# Patient Record
Sex: MALE | Race: OTHER | HISPANIC OR LATINO | Employment: FULL TIME | URBAN - METROPOLITAN AREA
[De-identification: names, ages, dates, MRNs, and addresses within clinical notes are randomized per-mention and may not be internally consistent; named-entity substitution may affect disease eponyms.]

---

## 2020-12-07 ENCOUNTER — APPOINTMENT (OUTPATIENT)
Dept: CT IMAGING | Age: 55
End: 2020-12-07
Attending: EMERGENCY MEDICINE

## 2020-12-07 ENCOUNTER — HOSPITAL ENCOUNTER (EMERGENCY)
Age: 55
Discharge: HOME | End: 2020-12-07
Attending: EMERGENCY MEDICINE

## 2020-12-07 VITALS
BODY MASS INDEX: 25.61 KG/M2 | WEIGHT: 150 LBS | DIASTOLIC BLOOD PRESSURE: 77 MMHG | HEIGHT: 64 IN | RESPIRATION RATE: 16 BRPM | TEMPERATURE: 98.4 F | SYSTOLIC BLOOD PRESSURE: 140 MMHG | HEART RATE: 61 BPM | OXYGEN SATURATION: 99 %

## 2020-12-07 DIAGNOSIS — G51.0 BELL'S PALSY: Primary | ICD-10-CM

## 2020-12-07 LAB — GLUCOSE BLD STRIP.AUTO-MCNC: 107 MG/DL (ref 65–100)

## 2020-12-07 PROCEDURE — 82962 GLUCOSE BLOOD TEST: CPT

## 2020-12-07 PROCEDURE — 99284 EMERGENCY DEPT VISIT MOD MDM: CPT

## 2020-12-07 PROCEDURE — 70450 CT HEAD/BRAIN W/O DYE: CPT

## 2020-12-07 PROCEDURE — 74011636637 HC RX REV CODE- 636/637: Performed by: EMERGENCY MEDICINE

## 2020-12-07 RX ORDER — PREDNISONE 20 MG/1
40 TABLET ORAL DAILY
Qty: 8 TAB | Refills: 0 | Status: SHIPPED | OUTPATIENT
Start: 2020-12-07 | End: 2020-12-11

## 2020-12-07 RX ORDER — ACYCLOVIR 800 MG/1
800 TABLET ORAL
Qty: 35 TAB | Refills: 0 | Status: SHIPPED | OUTPATIENT
Start: 2020-12-07 | End: 2020-12-14

## 2020-12-07 RX ADMIN — PREDNISONE 60 MG: 10 TABLET ORAL at 18:49

## 2020-12-07 NOTE — PROGRESS NOTES
rounded on patient with nurse. Interpreting services offered when needed.       [address] [facility]  Language Services Department  [place] [id number] [phone] (phone)

## 2020-12-07 NOTE — ED PROVIDER NOTES
[facility] EMERGENCY DEPT   Arrival Date/Time: 12/7/2020 @ [address] Quirino Jarrell  MRN: [id number]      48 y.o. male    YOB: 1965   Telephone Information:   Mobile [phone] (home)     Seen in: HA/A  Seen on 12/7/2020 @ 6:23 PM       Today's Chief Complaint:   Chief Complaint   Patient presents with    Facial Droop     HPI (4+): well apppearing 47 yo male presents to the ED with left sided facial droop. sts he was having problems brushing teeth and washing face this morning after waking. Later when he went to the office at work they asked if he was okay and wondered what was wrong with his eye and at lumch his drink was dribbling out of the left side of his mouth    Pt unable to fully close the left eye, left sided facial droop    No other focal deficits  No weakness  No dyarthtria  No ataxia  No dizziness    No previous episodes   HPI    Review of Systems (10+): Review of Systems   Constitutional: Negative for activity change, appetite change, fatigue and fever. HENT: Negative for rhinorrhea and sore throat. Eyes: Negative for photophobia, pain and redness. Respiratory: Negative for cough, shortness of breath and wheezing. Cardiovascular: Negative for chest pain and palpitations. Gastrointestinal: Negative for abdominal pain, nausea and vomiting. Genitourinary: Negative for dysuria and frequency. Musculoskeletal: Negative for back pain. Skin: Negative for color change and rash. Neurological: Negative for dizziness, speech difficulty, weakness and headaches. Psychiatric/Behavioral: Negative. Past Medical History: Primary Care Doctor: Maeve Pierre DO  [X] Meds, Medical Hx, Surgical Hx, Family Hx, Social Hx are reviewed & located at the end of this note     Allergies: No Known Allergies      Key Anti-Platelet Anticoagulant Meds     The patient is on no antiplatelet meds or anticoagulants.           Physical Exam (8+):  [X] Nursing documentation reviewed. Patient Vitals for the past 24 hrs:   Temp Pulse Resp BP SpO2   12/07/20 1733 98.4 °F (36.9 °C) 69 18 (!) 146/89 98 %      Estimated body mass index is 25.75 kg/m² as calculated from the following:    Height as of this encounter: 5' 4\" (1.626 m). Weight as of this encounter: 68 kg (150 lb). Vital signs were reviewed. Physical Exam  Vitals signs and nursing note reviewed. Constitutional:       General: He is not in acute distress. Appearance: Normal appearance. He is not ill-appearing or toxic-appearing. HENT:      Head: Normocephalic and atraumatic. Right Ear: External ear normal.      Left Ear: External ear normal.      Nose: Nose normal.   Eyes:      Extraocular Movements: Extraocular movements intact. Pupils: Pupils are equal, round, and reactive to light. Neck:      Musculoskeletal: Normal range of motion and neck supple. Cardiovascular:      Rate and Rhythm: Normal rate and regular rhythm. Pulses: Normal pulses. Heart sounds: Normal heart sounds. Pulmonary:      Effort: Pulmonary effort is normal.      Breath sounds: Normal breath sounds. Abdominal:      General: There is no distension. Tenderness: There is no abdominal tenderness. Musculoskeletal: Normal range of motion. Skin:     General: Skin is warm. Capillary Refill: Capillary refill takes less than 2 seconds. Neurological:      Mental Status: He is alert and oriented to person, place, and time. Cranial Nerves: No dysarthria. Comments: Left-sided facial droop. Ptosis of the left eye.     are equal.  No pronator drift. No ataxia. No dysarthria. No word finding.      Psychiatric:         Mood and Affect: Mood normal.         Behavior: Behavior normal.         [facility]  MEDICAL DECISION MAKING: (Including Differential Dx, and Plan)   49 yo male woke up with left side facial droop-- difficulty brushing teeth and got soap in his eye because it would not close  No other defictis   Differential Diagnosis: stroke, ICH, bell's palsy   Plan: CT of the head     This is a new problem that does need additional workup   Labs/Radiographs/ECG were ordered: yes CT/US/XRay/MRI were visualized by me: yes   Obtained and Reviewed Old Records or History from someone other than the patient:      The patient's problem is:  moderate    Diagnostic Options are:  minimal risk    Management Options are:  moderate risk     Data/Management:  (.addold, . addecg)   Lab findings during this visit:   Recent Results (from the past 50 hour(s))   GLUCOSE, POC    Collection Time: 12/07/20  5:58 PM   Result Value Ref Range    Glucose (POC) 107 (H) 65 - 100 mg/dL     Radiology studies during this visit: Ct Head Wo Cont    Result Date: 12/7/2020  IMPRESSION:  Negative for acute intracranial abnormality. Medications given in the ED:   Medications   predniSONE (DELTASONE) tablet 60 mg (has no administration in time range)        Procedure Documentation:    (.addlac  .addabscess   . addreduction   . addintubation    . addprocdoc)      Procedures    Recheck and Additional Documentation:  (use .addrecheck  . addsepsis   . addstroke   . addhip  .addcctime . emergcnt )     Pt resting  Left facial nerve palsy  No other neuro findings  Looks well    CT negative --     I do not believe this is a stroke,      Other ED Course Notes:        I wore appropriate PPE throughout this patient's ED encounter. Impression and Disposition: (.poli     .jackieupstazbigniew   . addhandoff  )     Disposition:   Discharge to home @ [phone]  in stable condition.      Impression:     ICD-10-CM ICD-9-CM   1. Bell's palsy  G51.0 351.0        Follow-up:   Follow-up Information     Follow up With Specialties Details Why Contact Info    Faustino Hernandez DO Family Medicine   [address]   [address]  [address] [facility] [address]  [phone]      [facility]   [address] [facility] [address] [facility]  [phone]         Discharge Medications:   Current Discharge Medication List      START taking these medications    Details   predniSONE (DELTASONE) 20 mg tablet Take 40 mg by mouth daily for 4 days. Qty: 8 Tab, Refills: 0      acyclovir (ZOVIRAX) 800 mg tablet Take 1 Tab by mouth five (5) times daily for 7 days. Qty: 35 Tab, Refills: 0                Past Medical History:      Past Medical History:   Diagnosis Date    Erectile dysfunction     Hypercholesterolemia      Past Surgical History:   Procedure Laterality Date    HX ORTHOPAEDIC      back surgery -hernia disk     Family History   Problem Relation Age of Onset    No Known Problems Mother     No Known Problems Father       Social History     Tobacco Use    Smoking status: Never Smoker    Smokeless tobacco: Never Used   Substance Use Topics    Alcohol use: Yes     Alcohol/week: 0.0 standard drinks     Comment: Social    Drug use: No     Prior to Admission Medications   Prescriptions Last Dose Informant Patient Reported? Taking?   atorvastatin (LIPITOR) 40 mg tablet   No No   Sig: Take 1 Tab by mouth daily.       Facility-Administered Medications: None

## 2020-12-07 NOTE — DISCHARGE INSTRUCTIONS
Ask the pharmacist for eye drops     Systane drops -- 2 drops in the left eye 4-5 times a day while awake    Systane gel -- apply to left eye at bedtime --- can reapply if you wake up during the night    Wear an eye patch at night to keep from scratching your eye    Follow up with the neurology doctor -- calll in the morning    CT HEAD WO CONT   Final Result   IMPRESSION:  Negative for acute intracranial abnormality.

## 2020-12-07 NOTE — PROGRESS NOTES
[facility] staff  available over the phone from 3:30 p.m. - midnight. Please call Lance at [phone] with any interpreting requests.       [address] [facility]  Language Services Department  [facility] [address]  [facility]  [address]  [phone] (phone)

## 2020-12-07 NOTE — Clinical Note
[address] [address] EMERGENCY DEPT 
[address] 
[place] [address] [phone] Work/School Note Date: 12/7/2020 To Whom It May concern: 
 
 
Silvia Perdomo was seen and treated today in the emergency room by the following provider(s): 
Attending Provider: Nathalie Frias MD. Silvia Perdomo is excused from work/school on 12/07/20. He is clear to return to work/school on 12/08/20.    
 
 
Sincerely, 
 
 
 
 
Rashad Muro MD

## 2020-12-08 NOTE — ED NOTES
I have reviewed discharge instructions with the patient and spouse. The patient and spouse verbalized understanding. Patient left ED via Discharge Method: ambulatory to Home with family. Opportunity for questions and clarification provided. Patient given 2 scripts. Pt to follow up with neurology this week. Pt instructed to show AVS to pharmacist for instructions on purchasing the correct OTC eye drops. To continue your aftercare when you leave the hospital, you may receive an automated call from our care team to check in on how you are doing. This is a free service and part of our promise to provide the best care and service to meet your aftercare needs.  If you have questions, or wish to unsubscribe from this service please call [phone]. Thank you for Choosing our [employer] Emergency Department.